# Patient Record
Sex: MALE | Race: WHITE
[De-identification: names, ages, dates, MRNs, and addresses within clinical notes are randomized per-mention and may not be internally consistent; named-entity substitution may affect disease eponyms.]

---

## 2017-04-14 ENCOUNTER — HOSPITAL ENCOUNTER (OUTPATIENT)
Dept: HOSPITAL 56 - MW.US | Age: 42
End: 2017-04-14
Attending: PHYSICIAN ASSISTANT
Payer: COMMERCIAL

## 2017-04-14 DIAGNOSIS — R10.9: Primary | ICD-10-CM

## 2017-04-14 NOTE — US
EXAMINATION: Right upper quadrant ultrasound

 

HISTORY: Pain

 

COMPARISON: None

 

TECHNIQUE: Grayscale and color Doppler images obtained of the right upper quadrant.

 

FINDINGS: The visualized pancreas appears normal. The liver is normal in contour, echogenicity, and 
size without a focal hepatic mass. The gallbladder wall thickness is normal. No pericholecystic flui
d or shadowing gallstones. The common bile duct measures 3 mm. The right kidney measures 10.5 cm aylin
e-to-pole without evidence of hydronephrosis. Sonographic Martines sign is negative.

 

IMPRESSION: Unremarkable right upper quadrant ultrasound.

## 2017-04-14 NOTE — US
EXAMINATION: Scrotal duplex ultrasound

 

HISTORY: Pain

 

COMPARISON: None

 

TECHNIQUE: Grayscale, color Doppler, and spectral Doppler images obtained of the scrotum. Additional
 images were obtained of the right lower quadrant.

 

FINDINGS: The testicles appear normal in size, contour, and echogenicity demonstrating normal color 
and spectral Doppler flow. The epididymides  appear normal bilaterally. No significant hydrocele or 
varicocele. No scrotal wall thickening. No abnormalities noted within the right lower quadrant.

 

IMPRESSION: Unremarkable scrotal duplex ultrasound.

## 2017-11-16 ENCOUNTER — HOSPITAL ENCOUNTER (OUTPATIENT)
Dept: HOSPITAL 56 - MW.SDS | Age: 42
Discharge: HOME | End: 2017-11-16
Attending: SURGERY
Payer: COMMERCIAL

## 2017-11-16 DIAGNOSIS — K57.90: ICD-10-CM

## 2017-11-16 DIAGNOSIS — E66.9: ICD-10-CM

## 2017-11-16 DIAGNOSIS — Z80.0: ICD-10-CM

## 2017-11-16 DIAGNOSIS — Z87.442: ICD-10-CM

## 2017-11-16 DIAGNOSIS — Z88.1: ICD-10-CM

## 2017-11-16 DIAGNOSIS — Z98.890: ICD-10-CM

## 2017-11-16 DIAGNOSIS — D12.5: Primary | ICD-10-CM

## 2017-11-16 DIAGNOSIS — K21.9: ICD-10-CM

## 2017-11-16 DIAGNOSIS — Z88.2: ICD-10-CM

## 2017-11-16 DIAGNOSIS — K62.5: ICD-10-CM

## 2017-11-16 PROCEDURE — 45380 COLONOSCOPY AND BIOPSY: CPT
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
## 2017-11-16 NOTE — PCM.OPNOTE
- General Post-Op/Procedure Note


Date of Surgery/Procedure: 11/16/17


Operative Procedure(s): Diagnostic colonoscopy


Findings: 


Sigmoid colon polyp 


Pre Op Diagnosis: Abdominal pain


Post-Op Diagnosis: Sigmoid colon polyp


Anesthesia Technique: MAC


Primary Surgeon: Juana Colon


Condition: Good

## 2017-11-16 NOTE — OR
SURGEON:

JUANA COLON MD

 

DATE OF PROCEDURE:  11/16/2017

 

PREOPERATIVE DIAGNOSIS:

Right lower quadrant pain.

 

POSTOPERATIVE DIAGNOSIS:

Sigmoid colon polyp.

 

PROCEDURE PERFORMED:

Diagnostic colonoscopy.

 

ENDOSCOPIST:

Dr. Juana Colon.

 

INSTRUMENT USED:

Olympus colonoscope.

 

ANESTHESIA:

MAC.

 

EXTENT OF EXAM:

To the cecum.

 

PREPARATION:

Good.

 

LIMITATIONS:

None.

 

INDICATION FOR EXAMINATION:

The patient is a 42-year-old male, who presents to my clinic with right lower

quadrant pain.  This originally radiated to his testicle.  He was seen by the

urologist, who diagnosed him with prostatitis and placed him on Bactrim.  The

testicular pain improved with the antibiotics, but he continues to have vague

abdominal pains.  A CT of the abdomen and pelvis was performed that showed mild

diverticulosis and questionable cyst within the liver.  The patient continues to

have these intermittent symptoms.  To look for a colonic source of the pain, we

discussed performing a diagnostic colonoscopy.  I explained the procedure as

well as expected perioperative course.  We discussed the risks, including

bleeding, infection, or damage to surrounding structures, including perforation.

The patient verbalized understanding and wishes to proceed.

 

PROCEDURE IN DETAIL:

The patient was brought to the endoscopy suite and placed in left lateral

decubitus position.  A time-out was completed verifying the patient's name, age,

date of birth, allergies, and procedure to be performed.  Monitored anesthesia

care was induced and continuous oxygen was provided via nasal cannula throughout

the procedure.  After adequate sedation was achieved, a digital rectal exam was

performed.  This exam was within normal limits.  A well lubricated colonoscope

was inserted into the rectum and advanced under direct visualization to the

level of the cecum.  The cecum was identified by both visual and anatomic

landmarks.  A photograph was taken of the cecal cap as well as with the scope

retroflexed within the cecum.  The scope was then straightened out and fully

withdrawn while examining the color, texture, anatomy, and integrity of the

mucosa from the cecum to the anal canal.  The patient was found to have a 1 to 2

mm sessile sigmoid colon polyp.  This was removed using a cold biopsy forceps.

The remainder of the colon appeared normal.  The scope was brought into the

rectum and retroflexed to allow visualization of the anal canal opening.  This

appeared normal and a photograph was taken.  The scope was then straightened out

and removed from the patient.  The cecum to anus time was 8 minutes.  The

patient was transferred to PACU in stable condition.

 

ENDOSCOPIC DIAGNOSIS:

Sigmoid colon polyp.

 

RECOMMENDATION:

Follow up in clinic in 2 weeks.  May consider a HIDA scan to assess gallbladder

function.

 

 

BERNIE EASTON

DD:  11/16/2017 10:45:10

DT:  11/16/2017 17:05:57

Job #:  [id number]

## 2019-09-18 ENCOUNTER — HOSPITAL ENCOUNTER (EMERGENCY)
Dept: HOSPITAL 56 - MW.ED | Age: 44
LOS: 1 days | Discharge: HOME | End: 2019-09-19
Payer: COMMERCIAL

## 2019-09-18 DIAGNOSIS — S06.0X0A: Primary | ICD-10-CM
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
## 2019-09-18 DIAGNOSIS — H91.92: ICD-10-CM

## 2019-09-18 DIAGNOSIS — Z88.2: ICD-10-CM
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
## 2019-09-18 DIAGNOSIS — Z88.1: ICD-10-CM
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
